# Patient Record
(demographics unavailable — no encounter records)

---

## 2024-10-03 NOTE — ASSESSMENT
[FreeTextEntry1] : 65 year F  with Hgb Sickle cell beta thalassemia disease    #Cough/Pneumonia -resolving -do xray September s/p doxycline  #Migraines -neurology referral  #bruises -denies NSAIDS -stable on left high PTT prolonged on mixing study with partial correction, likely has inhibitor  #Sickle cell disease discussed disease modifying medications including hydroxyurea, l-glutamine, voxelotor, crizanlizumab continue crizanlizumab once available at [place], although patient needs to be punctual hydroxyurea will restart, wounds healed, platelets  72and Hgb 9 will decrease to daily hydroxyurea 500mg, and stop hydroxyurrea 500mg alternating with 1000mg,  Not a patient Candidate for transplant/gene therapy -will do adakveo today f/u 1 month  #Hip/knee pain -needs orthopedic surgery evaluation for possible surgery severe osteoarthritis, avascular necrosis not mentioned in report, suspect likely AVN realted currently in rehab will do MRI at [facility]     #UTI resolved -treated with urology in the past West Saldivar MD, FACS [phone] -repeat urinalysis normal, will need to follow up with urology   #Falls -unclear if related to knees or dizziness, will refer back to orthopedics at [facility] and can get physical therapy -will also refer to ENT for possible vertigo -patient advised to stop alprazolam stop cyclobenzaprine with lyrica warned that morphine and oxycodone in combination with other medications can cause falls, patient gets xanax not from another provider and is reluctant to stop    #Elevated ferritin - Mild hepatic iron overload (3.8 mg/g).        Liver iron concentration range for clinical relevance:    0.17-1.8 mg/g -Normal range.    3.2-7.0 mg/g - suggested optimal range for liver iron concentration for chelation therapy in  transfusional iron overload.    7.0-15.0 mg/g - increased risk for complications.    >15.0 mg/g - greatly increased risk of cardiac disease and early death inpatient with transfusional iron overload).      -will start jadenu 360mg every other day   #Thrombocytopenia -chronic  -likely from spleen   #Pain given pain contract on 10/27/2023 continue oxycodone 5mg  discussed long acting morphine 15mg to three times a day.  has miralax at home Istop# [phone]  #Fibromyalgia -lyrica patient wants to fill with dewayne colin with [phone] Sophie Ottoniel believes lyrica benefits patient if fall persistent will reconnect  #HCM Last optho appointment in April 2023, will follow up with Dr. Yamel Pereira Last pneumonia vaccine Last urine protein/microalbumin Has PCP/OB/gyn Gabriela Castro  #Next appointment in 4 weeks -adakveo    #Labs at next appointment CBC, retic, iron, ferritin, urinalysis, urine toxicology, hgb electrophoresis, CMP, LDH

## 2024-10-03 NOTE — PHYSICAL EXAM
[Obese] : obese [Normal] : affect appropriate [de-identified] : bruises on skin [de-identified] : wheelchair
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
## 2024-10-03 NOTE — REVIEW OF SYSTEMS
[Difficulty Walking] : difficulty walking [Suicidal] : not suicidal [Anxiety] : anxiety [Negative] : Allergic/Immunologic [FreeTextEntry4] : ears popping [FreeTextEntry9] : Right knee pain [de-identified] : ecchymosis right side of leg from fall [de-identified] : frequent falls
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
## 2024-11-22 NOTE — ASSESSMENT
[FreeTextEntry1] : 65 year F  with Hgb Sickle cell beta thalassemia disease    #Cough/Pneumonia -resolving -do xray September s/p doxycline  #Migraines -neurology referral  #bruises -denies NSAIDS -stable on left high PTT prolonged on mixing study with partial correction, likely has inhibitor  #Sickle cell disease discussed disease modifying medications including hydroxyurea, l-glutamine, voxelotor, crizanlizumab continue crizanlizumab once available at [place], although patient needs to be punctual hydroxyurea will restart, wounds healed, platelets  72and Hgb 9 will decrease to daily hydroxyurea 500mg, and stop hydroxyurrea 500mg alternating with 1000mg,  Not a patient Candidate for transplant/gene therapy -will do adakveo today f/u 1 month  #Hip/knee pain -needs orthopedic surgery evaluation for possible surgery severe osteoarthritis, avascular necrosis not mentioned in report, suspect likely AVN realted currently in rehab will do MRI at [facility]     #UTI resolved -treated with urology in the past West Saldivar MD, FACS [phone] -repeat urinalysis normal, will need to follow up with urology   #Falls -unclear if related to knees or dizziness, will refer back to orthopedics at [facility] and can get physical therapy -will also refer to ENT for possible vertigo -patient advised to stop alprazolam stop cyclobenzaprine with lyrica warned that morphine and oxycodone in combination with other medications can cause falls, patient gets xanax not from another provider and is reluctant to stop    #Elevated ferritin - Mild hepatic iron overload (3.8 mg/g).        Liver iron concentration range for clinical relevance:    0.17-1.8 mg/g -Normal range.    3.2-7.0 mg/g - suggested optimal range for liver iron concentration for chelation therapy in  transfusional iron overload.    7.0-15.0 mg/g - increased risk for complications.    >15.0 mg/g - greatly increased risk of cardiac disease and early death inpatient with transfusional iron overload).      -will start jadenu 360mg every other day   #Thrombocytopenia -chronic  -likely from spleen   #Pain given pain contract on 10/27/2023 continue oxycodone 5mg  discussed long acting morphine 15mg to three times a day.  has miralax at home Istop# [phone]  #Fibromyalgia -lyrica patient wants to fill with dewyane colin with [phone] Sophie Ottoniel believes lyrica benefits patient if fall persistent will reconnect  #HCM Last optho appointment in April 2023, will follow up with Dr. Yamel Pereira Last pneumonia vaccine Last urine protein/microalbumin Has PCP/OB/gyn Gabriela Castro  #Next appointment in 4 weeks -adakveo    #Labs at next appointment CBC, retic, iron, ferritin, urinalysis, urine toxicology, hgb electrophoresis, CMP, LDH

## 2024-11-22 NOTE — REVIEW OF SYSTEMS
[Suicidal] : not suicidal [FreeTextEntry4] : ears popping [FreeTextEntry9] : Right knee pain [de-identified] : ecchymosis right side of leg from fall [de-identified] : frequent falls

## 2025-01-28 NOTE — ASSESSMENT
[FreeTextEntry1] : 65 year F  with Hgb Sickle cell beta thalassemia disease   #Cough/Pneumonia -Resolved  #Migraines -neurology referral  #bruises -denies NSAIDS, stable  PTT prolonged on mixing study with partial correction, likely has inhibitor  #Sickle cell disease discussed disease modifying medications including hydroxyurea, l-glutamine, voxelotor, crizanlizumab continue crizanlizumab once available at [place], although patient needs to be punctual C/W hydroxyurea 500 mg po daily wounds have healed, platelets pending labs today  Not a patient Candidate for transplant/gene therapy -will do adakveo today f/u 1 month  #Hip/knee pain -needs orthopedic surgery evaluation for possible surgery-- February 2025  severe osteoarthritis, avascular necrosis not mentioned in report, suspect likely AVN realted will do MRI at [facility]     #UTI resolved -treated with urology in the past West Saldivar MD, FACS [phone] -Reports being treated with augmentin- retesting urine culture today    #Falls -unclear if related to knees or dizziness, will refer back to orthopedics at [facility] and can get physical therapy -will also refer to ENT for possible vertigo -patient advised to stop alprazolam stop cyclobenzaprine with lyrica warned that morphine and oxycodone in combination with other medications can cause falls, patient gets xanax not from another provider and is reluctant to stop    #Elevated ferritin - Mild hepatic iron overload (3.8 mg/g).        Liver iron concentration range for clinical relevance:    0.17-1.8 mg/g -Normal range.    3.2-7.0 mg/g - suggested optimal range for liver iron concentration for chelation therapy in  transfusional iron overload.    7.0-15.0 mg/g - increased risk for complications.    >15.0 mg/g - greatly increased risk of cardiac disease and early death inpatient with transfusional iron overload).      -will start jadenu 360mg every other day   #Thrombocytopenia -chronic  -likely from spleen   #Pain given pain contract on 10/27/2023 continue oxycodone 5mg  discussed long acting morphine 15mg to three times a day.  has miralax at home Istop# [phone]  #Fibromyalgia -lyrica patient wants to fill with rheumatologys emilie with [phone] Sophie Alcazar believes lyrica benefits patient if fall persistent will reconnect  #HCM Last optho appointment in April 2023, will follow up with Dr. Yamel Pereira Last pneumonia vaccine Last urine protein/microalbumin Has PCP/OB/gyn Gabriela Castro  #Next appointment in 4 weeks -adakveo    #Labs at next appointment  CBC, retic, iron, ferritin, urinalysis, urine toxicology, hgb electrophoresis, CMP, LDH

## 2025-01-28 NOTE — ASSESSMENT
[FreeTextEntry1] : 65 year F  with Hgb Sickle cell beta thalassemia disease   #Cough/Pneumonia -Resolved  #Migraines -neurology referral  #bruises -denies NSAIDS, stable  PTT prolonged on mixing study with partial correction, likely has inhibitor  #Sickle cell disease discussed disease modifying medications including hydroxyurea, l-glutamine, voxelotor, crizanlizumab continue crizanlizumab once available at [place], although patient needs to be punctual C/W hydroxyurea 500 mg po daily wounds have healed, platelets pending labs today  Not a patient Candidate for transplant/gene therapy -will do adakveo today f/u 1 month  #Hip/knee pain -needs orthopedic surgery evaluation for possible surgery-- February 2025  severe osteoarthritis, avascular necrosis not mentioned in report, suspect likely AVN realted will do MRI at [facility]     #UTI resolved -treated with urology in the past West Saldivar MD, FACS [phone] -Reports being treated with augmentin- retesting urine culture today    #Falls -unclear if related to knees or dizziness, will refer back to orthopedics at [facility] and can get physical therapy -will also refer to ENT for possible vertigo -patient advised to stop alprazolam stop cyclobenzaprine with lyrica warned that morphine and oxycodone in combination with other medications can cause falls, patient gets xanax not from another provider and is reluctant to stop    #Elevated ferritin - Mild hepatic iron overload (3.8 mg/g).        Liver iron concentration range for clinical relevance:    0.17-1.8 mg/g -Normal range.    3.2-7.0 mg/g - suggested optimal range for liver iron concentration for chelation therapy in  transfusional iron overload.    7.0-15.0 mg/g - increased risk for complications.    >15.0 mg/g - greatly increased risk of cardiac disease and early death inpatient with transfusional iron overload).      -will start jadenu 360mg every other day   #Thrombocytopenia -chronic  -likely from spleen   #Pain given pain contract on 10/27/2023 continue oxycodone 5mg  discussed long acting morphine 15mg to three times a day.  has miralax at home Istop# [id number]  #Fibromyalgia -lyrica patient wants to fill with rheumatologys emilie with [phone] Sophie Alcazar believes lyrica benefits patient if fall persistent will reconnect  #HCM Last optho appointment in April 2023, will follow up with Dr. Yamel Pereira Last pneumonia vaccine Last urine protein/microalbumin Has PCP/OB/gyn Gabriela Castro  #Next appointment in 4 weeks -adakveo    #Labs at next appointment  CBC, retic, iron, ferritin, urinalysis, urine toxicology, hgb electrophoresis, CMP, LDH
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
## 2025-01-28 NOTE — ASSESSMENT
[FreeTextEntry1] : 65 year F  with Hgb Sickle cell beta thalassemia disease   #Cough/Pneumonia -Resolved  #Migraines -neurology referral  #bruises -denies NSAIDS, stable  PTT prolonged on mixing study with partial correction, likely has inhibitor  #Sickle cell disease discussed disease modifying medications including hydroxyurea, l-glutamine, voxelotor, crizanlizumab continue crizanlizumab once available at [place], although patient needs to be punctual C/W hydroxyurea 500 mg po daily wounds have healed, platelets pending labs today  Not a patient Candidate for transplant/gene therapy -will do adakveo today f/u 1 month  #Hip/knee pain -needs orthopedic surgery evaluation for possible surgery-- February 2025  severe osteoarthritis, avascular necrosis not mentioned in report, suspect likely AVN realted will do MRI at [facility]     #UTI resolved -treated with urology in the past West Saldivar MD, FACS [phone] -Reports being treated with augmentin- retesting urine culture today    #Falls -unclear if related to knees or dizziness, will refer back to orthopedics at [facility] and can get physical therapy -will also refer to ENT for possible vertigo -patient advised to stop alprazolam stop cyclobenzaprine with lyrica warned that morphine and oxycodone in combination with other medications can cause falls, patient gets xanax not from another provider and is reluctant to stop    #Elevated ferritin - Mild hepatic iron overload (3.8 mg/g).        Liver iron concentration range for clinical relevance:    0.17-1.8 mg/g -Normal range.    3.2-7.0 mg/g - suggested optimal range for liver iron concentration for chelation therapy in  transfusional iron overload.    7.0-15.0 mg/g - increased risk for complications.    >15.0 mg/g - greatly increased risk of cardiac disease and early death inpatient with transfusional iron overload).      -will start jadenu 360mg every other day   #Thrombocytopenia -chronic  -likely from spleen   #Pain given pain contract on 10/27/2023 continue oxycodone 5mg  discussed long acting morphine 15mg to three times a day.  has miralax at home Istop# [id number]  #Fibromyalgia -lyrica patient wants to fill with rheumatologys emilie with [phone] Sophie Alcazar believes lyrica benefits patient if fall persistent will reconnect  #HCM Last optho appointment in April 2023, will follow up with Dr. Yamel Pereira Last pneumonia vaccine Last urine protein/microalbumin Has PCP/OB/gyn Gabriela Castro  #Next appointment in 4 weeks -adakveo    #Labs at next appointment  CBC, retic, iron, ferritin, urinalysis, urine toxicology, hgb electrophoresis, CMP, LDH

## 2025-01-28 NOTE — REVIEW OF SYSTEMS
[de-identified] : ecchymosis right side of leg from fall [Suicidal] : not suicidal [FreeTextEntry4] : ears popping [FreeTextEntry9] : Right knee pain, Right shoulder pain.  [de-identified] : frequent falls

## 2025-01-28 NOTE — REVIEW OF SYSTEMS
[de-identified] : ecchymosis right side of leg from fall [Suicidal] : not suicidal [FreeTextEntry4] : ears popping [FreeTextEntry9] : Right knee pain, Right shoulder pain.  [de-identified] : frequent falls
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
## 2025-01-28 NOTE — REVIEW OF SYSTEMS
[de-identified] : ecchymosis right side of leg from fall [Suicidal] : not suicidal [FreeTextEntry4] : ears popping [FreeTextEntry9] : Right knee pain, Right shoulder pain.  [de-identified] : frequent falls
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
## 2025-02-28 NOTE — END OF VISIT
2-26-25  Pt reports she's out of meds  Letha    [Time Spent: ___ minutes] : I have spent [unfilled] minutes of time on the encounter which excludes teaching and separately reported services.

## 2025-03-04 NOTE — REVIEW OF SYSTEMS
[Difficulty Walking] : difficulty walking [Suicidal] : not suicidal [Anxiety] : anxiety [Negative] : Allergic/Immunologic [Shortness Of Breath] : shortness of breath [Diarrhea: Grade 1 - Increase of <4 stools per day over baseline; mild increase in ostomy output compared to baseline] : Diarrhea: Grade 1 - Increase of <4 stools per day over baseline; mild increase in ostomy output compared to baseline [Insomnia] : insomnia [FreeTextEntry4] : ears popping [FreeTextEntry6] : SOB at rest for the last month [FreeTextEntry7] : diarrhea x 3 yesterday [FreeTextEntry9] : Right knee pain, Right shoulder pain.  [de-identified] : frequent falls
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
## 2025-03-04 NOTE — REVIEW OF SYSTEMS
[Difficulty Walking] : difficulty walking [Suicidal] : not suicidal [Anxiety] : anxiety [Negative] : Allergic/Immunologic [Shortness Of Breath] : shortness of breath [Diarrhea: Grade 1 - Increase of <4 stools per day over baseline; mild increase in ostomy output compared to baseline] : Diarrhea: Grade 1 - Increase of <4 stools per day over baseline; mild increase in ostomy output compared to baseline [Insomnia] : insomnia [FreeTextEntry4] : ears popping [FreeTextEntry6] : SOB at rest for the last month [FreeTextEntry7] : diarrhea x 3 yesterday [FreeTextEntry9] : Right knee pain, Right shoulder pain.  [de-identified] : frequent falls

## 2025-03-04 NOTE — BEGINNING OF VISIT
[0] : 2) Feeling down, depressed, or hopeless: Not at all (0) [PHQ-2 Negative] : PHQ-2 Negative [[id number]] : 0 [Pain Scale: ___] : On a scale of 1-10, today the patient's pain is a(n) [unfilled]. [Never] : Never [Date Discussed (MM/DD/YY): ___] : Discussed: [unfilled] [Patient/Caregiver unclear of wishes] : Patient/Caregiver unclear of wishes

## 2025-03-04 NOTE — ASSESSMENT
[Patient/Caregiver unclear of wishes] : Patient/Caregiver unclear of wishes [Full Code] : full code [FreeTextEntry1] : 65 year F  with Hgb Sickle cell beta thalassemia disease  #Diarrhea -denies fevers or red stool, started from he hospial, was on zosyn in the hospital   #Hip/knee pain -follows with [facility] orthopedic surgery evaluation for possible surgery severe osteoarthritis, avascular necrosis not mentioned in report, suspect likely AVN realted -follows at [facility], want PT before surgery   #Sickle cell disease discussed disease modifying medications including hydroxyurea, l-glutamine, voxelotor, crizanlizumab continue crizanlizumab once available at [place], although patient needs to be punctual C/W hydroxyurea 500 mg po daily wounds have healed, platelets pending labs today  Not a patient Candidate for transplant/gene therapy -will do adakveo today f/u 1 month  #Frequent crisis -has had frequent crises at outside hospitals will do biweekly visits , follow up daniela  #Cough/Pneumonia -Resolved  #Migraines -neurology referral  #bruises -denies NSAIDS, stable  PTT prolonged on mixing study with partial correction, likely has inhibitor      #UTI resolved -treated with urology in the past West Saldivar MD, FACS [phone] -Reports being treated with augmentin- retesting urine culture today    #Falls -unclear if related to knees or dizziness, will refer back to orthopedics at [facility] and can get physical therapy -will also refer to ENT for possible vertigo -patient advised to stop alprazolam stop cyclobenzaprine with lyrica warned that morphine and oxycodone in combination with other medications can cause falls, patient gets xanax not from another provider and is reluctant to stop    #Elevated ferritin - Mild hepatic iron overload (3.8 mg/g).        Liver iron concentration range for clinical relevance:    0.17-1.8 mg/g -Normal range.    3.2-7.0 mg/g - suggested optimal range for liver iron concentration for chelation therapy in  transfusional iron overload.    7.0-15.0 mg/g - increased risk for complications.    >15.0 mg/g - greatly increased risk of cardiac disease and early death inpatient with transfusional iron overload).      -will start jadenu 360mg every other day   #Thrombocytopenia -chronic  -likely from spleen   #Pain given pain contract on 10/27/2023 continue oxycodone 5mg  discussed long acting morphine 15mg to three times a day.  has miralax at home Istop# [phone]  #Fibromyalgia -lyrica patient wants to fill with dewayne colin with [phone] Sophie Ottoniel believes lyrica benefits patient if fall persistent will reconnect  #HCM Last optho appointment in April 2023, will follow up with Dr. Yamel Pereira Last pneumonia vaccine Last urine protein/microalbumin Has PCP/OB/gyn Gabriela Castro  #Next appointment in  4 weeks adakveo 1 week dilaudid 1mg every 1 hour    #Labs at next appointment  CBC, retic, iron, ferritin, urinalysis, urine toxicology, hgb electrophoresis, CMP, LDH    [AdvancecareDate] : 03/04/25

## 2025-03-04 NOTE — BEGINNING OF VISIT
[0] : 2) Feeling down, depressed, or hopeless: Not at all (0) [PHQ-2 Negative] : PHQ-2 Negative [[id number]] : 0 [Pain Scale: ___] : On a scale of 1-10, today the patient's pain is a(n) [unfilled]. [Never] : Never [Date Discussed (MM/DD/YY): ___] : Discussed: [unfilled] [Patient/Caregiver unclear of wishes] : Patient/Caregiver unclear of wishes

## 2025-03-04 NOTE — REVIEW OF SYSTEMS
[Difficulty Walking] : difficulty walking [Suicidal] : not suicidal [Anxiety] : anxiety [Negative] : Allergic/Immunologic [Shortness Of Breath] : shortness of breath [Diarrhea: Grade 1 - Increase of <4 stools per day over baseline; mild increase in ostomy output compared to baseline] : Diarrhea: Grade 1 - Increase of <4 stools per day over baseline; mild increase in ostomy output compared to baseline [Insomnia] : insomnia [FreeTextEntry4] : ears popping [FreeTextEntry6] : SOB at rest for the last month [FreeTextEntry7] : diarrhea x 3 yesterday [FreeTextEntry9] : Right knee pain, Right shoulder pain.  [de-identified] : frequent falls
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
## 2025-03-04 NOTE — BEGINNING OF VISIT
[0] : 2) Feeling down, depressed, or hopeless: Not at all (0) [PHQ-2 Negative] : PHQ-2 Negative [[id number]] : 0 [Pain Scale: ___] : On a scale of 1-10, today the patient's pain is a(n) [unfilled]. [Never] : Never [Date Discussed (MM/DD/YY): ___] : Discussed: [unfilled] [Patient/Caregiver unclear of wishes] : Patient/Caregiver unclear of wishes
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
## 2025-03-12 NOTE — ASSESSMENT
[FreeTextEntry1] : 65 year F  with Hgb Sickle cell beta thalassemia disease  #Diarrhea -denies fevers or red stool, started from he hospial, was on zosyn in the hospital   #Hip/knee pain -follows with [facility] orthopedic surgery evaluation for possible surgery severe osteoarthritis, avascular necrosis not mentioned in report, suspect likely AVN realted -follows at [facility], want PT before surgery   #Sickle cell disease discussed disease modifying medications including hydroxyurea, l-glutamine, voxelotor, crizanlizumab continue crizanlizumab once available at [place], although patient needs to be punctual C/W hydroxyurea 500 mg po daily wounds have healed, platelets pending labs today  Not a patient Candidate for transplant/gene therapy -will do adakveo today f/u 1 month  #Frequent crisis -has had frequent crises at outside hospitals will do biweekly visits , follow up daniela  #Cough/Pneumonia -Resolved  #Migraines -neurology referral  #bruises -denies NSAIDS, stable  PTT prolonged on mixing study with partial correction, likely has inhibitor      #UTI resolved -treated with urology in the past [place] Saldivar MD, FACS [phone] -Reports being treated with augmentin- retesting urine culture today    #Falls -unclear if related to knees or dizziness, will refer back to orthopedics at [facility] and can get physical therapy -will also refer to ENT for possible vertigo -patient advised to stop alprazolam stop cyclobenzaprine with lyrica warned that morphine and oxycodone in combination with other medications can cause falls, patient gets xanax not from another provider and is reluctant to stop    #Elevated ferritin - Mild hepatic iron overload (3.8 mg/g).        Liver iron concentration range for clinical relevance:    0.17-1.8 mg/g -Normal range.    3.2-7.0 mg/g - suggested optimal range for liver iron concentration for chelation therapy in  transfusional iron overload.    7.0-15.0 mg/g - increased risk for complications.    >15.0 mg/g - greatly increased risk of cardiac disease and early death inpatient with transfusional iron overload).      -will start jadenu 360mg every other day   #Thrombocytopenia -chronic  -likely from spleen   #Pain given pain contract on 10/27/2023 continue oxycodone 5mg  discussed long acting morphine 15mg to three times a day.  has miralax at home Istop# [id number]  #Fibromyalgia -lyrica patient wants to fill with dewayne colin with [phone] Sophie Ottoniel believes lyrica benefits patient if fall persistent will reconnect  #HCM Last optho appointment in April 2023, will follow up with Dr. Yamel Pereira Last pneumonia vaccine Last urine protein/microalbumin Has PCP/OB/gyn Gabriela Castro  #Next appointment in  4 weeks adakveo 1 week dilaudid 1mg every 1 hour    #Labs at next appointment  CBC, retic, iron, ferritin, urinalysis, urine toxicology, hgb electrophoresis, CMP, LDH    [Patient/Caregiver unclear of wishes] : Patient/Caregiver unclear of wishes [AdvancecareDate] : 03/04/25 [Full Code] : full code

## 2025-03-12 NOTE — END OF VISIT
[Time Spent: ___ minutes] : I have spent [unfilled] minutes of time on the encounter which excludes teaching and separately reported services. There are no Wet Read(s) to document.

## 2025-03-12 NOTE — REVIEW OF SYSTEMS
[Shortness Of Breath] : shortness of breath [Diarrhea: Grade 1 - Increase of <4 stools per day over baseline; mild increase in ostomy output compared to baseline] : Diarrhea: Grade 1 - Increase of <4 stools per day over baseline; mild increase in ostomy output compared to baseline [Difficulty Walking] : difficulty walking [Insomnia] : insomnia [Anxiety] : anxiety [Negative] : Allergic/Immunologic [FreeTextEntry4] : ears popping [FreeTextEntry6] : SOB at rest for the last month [FreeTextEntry7] : diarrhea x 3 yesterday [FreeTextEntry9] : Right knee pain, Right shoulder pain.  [de-identified] : frequent falls
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
## 2025-03-31 NOTE — REVIEW OF SYSTEMS
[FreeTextEntry4] : ears popping [FreeTextEntry6] : SOB at rest for the last month [FreeTextEntry7] : diarrhea x 3 yesterday [FreeTextEntry9] : Right knee pain, Right shoulder pain.  [de-identified] : frequent falls
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
## 2025-03-31 NOTE — REVIEW OF SYSTEMS
[FreeTextEntry4] : ears popping [FreeTextEntry6] : SOB at rest for the last month [FreeTextEntry7] : diarrhea x 3 yesterday [FreeTextEntry9] : Right knee pain, Right shoulder pain.  [de-identified] : frequent falls

## 2025-03-31 NOTE — ASSESSMENT
[FreeTextEntry1] : 65 year F  with Hgb Sickle cell beta thalassemia disease  #Diarrhea -stopped   #Hip/knee pain -follows with [facility] orthopedic surgery evaluation for possible surgery severe osteoarthritis, avascular necrosis not mentioned in report, suspect likely AVN related -follows at [facility], want PT before surgery -has virtual visits at [facility] to prepare for surgery for knee surgery   #Sickle cell disease discussed disease modifying medications including hydroxyurea, l-glutamine, voxelotor, crizanlizumab continue crizanlizumab once available at [place], although patient needs to be punctual C/W hydroxyurea 500 mg po daily wounds have healed, platelets pending labs today  Not a patient Candidate for transplant/gene therapy -will do adakveo today f/u 1 month  #Frequent crisis -has had frequent crises at outside hospitals will do biweekly visits , follow up dainela  #Cough/Pneumonia -Resolved  #Migraines -neurology referral  #bruises -denies NSAIDS, stable  PTT prolonged on mixing study with partial correction, likely has inhibitor hold vitamin K      #UTI resolved -treated with urology in the past West Saldivar MD, FACS [phone] -Reports being treated with augmentin- retesting urine culture today    #Falls -unclear if related to knees or dizziness, will refer back to orthopedics at [facility] and can get physical therapy -will also refer to ENT for possible vertigo -patient advised to stop alprazolam stop cyclobenzaprine with lyrica warned that morphine and oxycodone in combination with other medications can cause falls, patient gets xanax not from another provider and is reluctant to stop    #Elevated ferritin - Mild hepatic iron overload (3.8 mg/g).        Liver iron concentration range for clinical relevance:    0.17-1.8 mg/g -Normal range.    3.2-7.0 mg/g - suggested optimal range for liver iron concentration for chelation therapy in  transfusional iron overload.    7.0-15.0 mg/g - increased risk for complications.    >15.0 mg/g - greatly increased risk of cardiac disease and early death inpatient with transfusional iron overload).      -will start jadenu 360mg every other day   #Thrombocytopenia -chronic  -likely from spleen   #Pain given pain contract on 10/27/2023 continue oxycodone 5mg  discussed long acting morphine 15mg to three times a day.  started buprenorphine has miralax at home Istop# [id number]  #Fibromyalgia -lyrica patient wants to fill with rheumatology,spoke with [phone] Sophie Ottoniel believes lyrica benefits patient if fall persistent will reconnect  #HCM Last optho appointment in April 2023, will follow up with Dr. Yamel Pereira Last pneumonia vaccine Last urine protein/microalbumin Has PCP/OB/gyn Gabriela Castro  #Next appointment in  4 weeks adakveo 1 week dilaudid 1mg every 1 hour    #Labs at next appointment  CBC, retic, iron, ferritin, urinalysis, urine toxicology, hgb electrophoresis, CMP, LDH    [AdvancecareDate] : 03/04/25
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
## 2025-04-25 NOTE — ASSESSMENT
[FreeTextEntry1] : 65 year F  with Hgb Sickle cell beta thalassemia disease  #Diarrhea -stopped   #Hip/knee pain -follows with [facility] orthopedic surgery evaluation for possible surgery severe osteoarthritis, avascular necrosis not mentioned in report, suspect likely AVN related -follows at [facility], want PT before surgery -has virtual visits at [facility] to prepare for surgery for knee surgery   #Sickle cell disease discussed disease modifying medications including hydroxyurea, l-glutamine, voxelotor, crizanlizumab continue crizanlizumab once available at [place], although patient needs to be punctual C/W hydroxyurea 500 mg po daily wounds have healed, platelets pending labs today  Not a patient Candidate for transplant/gene therapy -will do adakveo today f/u 1 month  #Frequent crisis -has had frequent crises at outside hospitals will do biweekly visits , follow up daniela  #Cough/Pneumonia -Resolved  #Migraines -neurology referral  #bruises -denies NSAIDS, stable  PTT prolonged on mixing study with partial correction, likely has inhibitor hold vitamin K      #UTI resolved -treated with urology in the past West Saldivar MD, FACS [phone] -Reports being treated with augmentin- retesting urine culture today    #Falls -unclear if related to knees or dizziness, will refer back to orthopedics at [facility] and can get physical therapy -will also refer to ENT for possible vertigo -patient advised to stop alprazolam stop cyclobenzaprine with lyrica warned that morphine and oxycodone in combination with other medications can cause falls, patient gets xanax not from another provider and is reluctant to stop    #Elevated ferritin - Mild hepatic iron overload (3.8 mg/g).        Liver iron concentration range for clinical relevance:    0.17-1.8 mg/g -Normal range.    3.2-7.0 mg/g - suggested optimal range for liver iron concentration for chelation therapy in  transfusional iron overload.    7.0-15.0 mg/g - increased risk for complications.    >15.0 mg/g - greatly increased risk of cardiac disease and early death inpatient with transfusional iron overload).      -will start jadenu 360mg every other day   #Thrombocytopenia -chronic  -likely from spleen   #Pain given pain contract on 10/27/2023 continue oxycodone 5mg  discussed long acting morphine 15mg to three times a day.  started buprenorphine has miralax at home Istop# [id number]  #Fibromyalgia -lyrica patient wants to fill with rheumatology,spoke with [phone] Sophie Ottoniel believes lyrica benefits patient if fall persistent will reconnect  #HCM Last optho appointment in April 2023, will follow up with Dr. Yamel Pereira Last pneumonia vaccine Last urine protein/microalbumin Has PCP/OB/gyn Gabriela Castro  #Next appointment in  4 weeks adakveo 1 week dilaudid 1mg every 1 hour    #Labs at next appointment  CBC, retic, iron, ferritin, urinalysis, urine toxicology, hgb electrophoresis, CMP, LDH    [Patient/Caregiver unclear of wishes] : Patient/Caregiver unclear of wishes [AdvancecareDate] : 03/04/25 [Full Code] : full code

## 2025-04-25 NOTE — REVIEW OF SYSTEMS
[Shortness Of Breath] : shortness of breath [Diarrhea: Grade 1 - Increase of <4 stools per day over baseline; mild increase in ostomy output compared to baseline] : Diarrhea: Grade 1 - Increase of <4 stools per day over baseline; mild increase in ostomy output compared to baseline [Difficulty Walking] : difficulty walking [Insomnia] : insomnia [Anxiety] : anxiety [Negative] : Allergic/Immunologic [FreeTextEntry4] : ears popping [FreeTextEntry6] : SOB at rest for the last month [FreeTextEntry7] : diarrhea x 3 yesterday [FreeTextEntry9] : Right knee pain, Right shoulder pain.  [de-identified] : frequent falls

## 2025-05-22 NOTE — ASSESSMENT
[FreeTextEntry1] : 65 year F  with Hgb Sickle cell beta thalassemia disease  #Diarrhea -stopped   #Hip/knee pain -follows with S orthopedic surgery evaluation for possible surgery severe osteoarthritis, avascular necrosis not mentioned in report, suspect likely AVN related -follows at [facility], want PT before surgery -has virtual visits at [facility] to prepare for surgery for knee surgery   #Sickle cell disease discussed disease modifying medications including hydroxyurea, l-glutamine, voxelotor, crizanlizumab continue crizanlizumab once available at [place], although patient needs to be punctual C/W hydroxyurea 500 mg po daily wounds have healed, platelets pending labs today  Not a patient Candidate for transplant/gene therapy -will do adakveo today f/u 1 month 05/22/2025 - Here for adakveo with hydration today - no longer on po abxs for a UTI  #Frequent crisis -has had frequent crises at outside hospitals will do biweekly visits , follow up daniela  #Cough/Pneumonia -Resolved  #Migraines -neurology referral  #bruises -denies NSAIDS, stable  PTT prolonged on mixing study with partial correction, likely has inhibitor hold vitamin K      #UTI resolved -treated with urology in the past [place] Saldivar MD, FACS [phone] -Reports being treated with augmentin- retesting urine culture today    #Falls -unclear if related to knees or dizziness, will refer back to orthopedics at [facility] and can get physical therapy -will also refer to ENT for possible vertigo -patient advised to stop alprazolam stop cyclobenzaprine with lyrica warned that morphine and oxycodone in combination with other medications can cause falls, patient gets xanax not from another provider and is reluctant to stop    #Elevated ferritin - Mild hepatic iron overload (3.8 mg/g).        Liver iron concentration range for clinical relevance:    0.17-1.8 mg/g -Normal range.    3.2-7.0 mg/g - suggested optimal range for liver iron concentration for chelation therapy in  transfusional iron overload.    7.0-15.0 mg/g - increased risk for complications.    >15.0 mg/g - greatly increased risk of cardiac disease and early death inpatient with transfusional iron overload).      -will start jadenu 360mg every other day   #Thrombocytopenia -chronic  -likely from spleen   #Pain given pain contract on 10/27/2023 continue oxycodone 5mg  discussed long acting morphine 15mg to three times a day.  started buprenorphine has miralax at home Istop# [phone]  #Fibromyalgia -lyrica patient wants to fill with rheumatology,spoke with [phone] Sophie Ottoniel believes lyrica benefits patient if fall persistent will reconnect  #HCM Last optho appointment in April 2023, will follow up with Dr. Yamel Pereira Last pneumonia vaccine Last urine protein/microalbumin Has PCP/OB/gyn Gabriela Castro  #Next appointment in  4 weeks adakveo 1 week dilaudid 1mg every 1 hour    #Labs at next appointment  CBC, retic, iron, ferritin, urinalysis, urine toxicology, hgb electrophoresis, CMP, LDH    [AdvancecareDate] : 03/04/25

## 2025-05-22 NOTE — REVIEW OF SYSTEMS
[FreeTextEntry4] : ears popping [FreeTextEntry6] : SOB at rest for the last month [FreeTextEntry7] : diarrhea x 3 yesterday [FreeTextEntry9] : Right knee pain, Right shoulder pain.  [de-identified] : frequent falls
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
## 2025-05-22 NOTE — REVIEW OF SYSTEMS
[FreeTextEntry4] : ears popping [FreeTextEntry6] : SOB at rest for the last month [FreeTextEntry7] : diarrhea x 3 yesterday [FreeTextEntry9] : Right knee pain, Right shoulder pain.  [de-identified] : frequent falls

## 2025-05-22 NOTE — REVIEW OF SYSTEMS
[FreeTextEntry4] : ears popping [FreeTextEntry6] : SOB at rest for the last month [FreeTextEntry7] : diarrhea x 3 yesterday [FreeTextEntry9] : Right knee pain, Right shoulder pain.  [de-identified] : frequent falls

## 2025-05-22 NOTE — ASSESSMENT
[FreeTextEntry1] : 65 year F  with Hgb Sickle cell beta thalassemia disease  #Diarrhea -stopped   #Hip/knee pain -follows with S orthopedic surgery evaluation for possible surgery severe osteoarthritis, avascular necrosis not mentioned in report, suspect likely AVN related -follows at [facility], want PT before surgery -has virtual visits at [facility] to prepare for surgery for knee surgery   #Sickle cell disease discussed disease modifying medications including hydroxyurea, l-glutamine, voxelotor, crizanlizumab continue crizanlizumab once available at [place], although patient needs to be punctual C/W hydroxyurea 500 mg po daily wounds have healed, platelets pending labs today  Not a patient Candidate for transplant/gene therapy -will do adakveo today f/u 1 month 05/22/2025 - Here for adakveo with hydration today - no longer on po abxs for a UTI  #Frequent crisis -has had frequent crises at outside hospitals will do biweekly visits , follow up daniela  #Cough/Pneumonia -Resolved  #Migraines -neurology referral  #bruises -denies NSAIDS, stable  PTT prolonged on mixing study with partial correction, likely has inhibitor hold vitamin K      #UTI resolved -treated with urology in the past West Saldivar MD, FACS [phone] -Reports being treated with augmentin- retesting urine culture today    #Falls -unclear if related to knees or dizziness, will refer back to orthopedics at [facility] and can get physical therapy -will also refer to ENT for possible vertigo -patient advised to stop alprazolam stop cyclobenzaprine with lyrica warned that morphine and oxycodone in combination with other medications can cause falls, patient gets xanax not from another provider and is reluctant to stop    #Elevated ferritin - Mild hepatic iron overload (3.8 mg/g).        Liver iron concentration range for clinical relevance:    0.17-1.8 mg/g -Normal range.    3.2-7.0 mg/g - suggested optimal range for liver iron concentration for chelation therapy in  transfusional iron overload.    7.0-15.0 mg/g - increased risk for complications.    >15.0 mg/g - greatly increased risk of cardiac disease and early death inpatient with transfusional iron overload).      -will start jadenu 360mg every other day   #Thrombocytopenia -chronic  -likely from spleen   #Pain given pain contract on 10/27/2023 continue oxycodone 5mg  discussed long acting morphine 15mg to three times a day.  started buprenorphine has miralax at home Istop# [id number]  #Fibromyalgia -lyrica patient wants to fill with rheumatology,spoke with [phone] Sophie Ottoniel believes lyrica benefits patient if fall persistent will reconnect  #HCM Last optho appointment in April 2023, will follow up with Dr. Yamel Pereira Last pneumonia vaccine Last urine protein/microalbumin Has PCP/OB/gyn Gabriela Castro  #Next appointment in  4 weeks adakveo 1 week dilaudid 1mg every 1 hour    #Labs at next appointment  CBC, retic, iron, ferritin, urinalysis, urine toxicology, hgb electrophoresis, CMP, LDH    [AdvancecareDate] : 03/04/25

## 2025-05-22 NOTE — ASSESSMENT
[FreeTextEntry1] : 65 year F  with Hgb Sickle cell beta thalassemia disease  #Diarrhea -stopped   #Hip/knee pain -follows with S orthopedic surgery evaluation for possible surgery severe osteoarthritis, avascular necrosis not mentioned in report, suspect likely AVN related -follows at [facility], want PT before surgery -has virtual visits at [facility] to prepare for surgery for knee surgery   #Sickle cell disease discussed disease modifying medications including hydroxyurea, l-glutamine, voxelotor, crizanlizumab continue crizanlizumab once available at [place], although patient needs to be punctual C/W hydroxyurea 500 mg po daily wounds have healed, platelets pending labs today  Not a patient Candidate for transplant/gene therapy -will do adakveo today f/u 1 month 05/22/2025 - Here for adakveo with hydration today - no longer on po abxs for a UTI  #Frequent crisis -has had frequent crises at outside hospitals will do biweekly visits , follow up daniela  #Cough/Pneumonia -Resolved  #Migraines -neurology referral  #bruises -denies NSAIDS, stable  PTT prolonged on mixing study with partial correction, likely has inhibitor hold vitamin K      #UTI resolved -treated with urology in the past West Saldivar MD, FACS [phone] -Reports being treated with augmentin- retesting urine culture today    #Falls -unclear if related to knees or dizziness, will refer back to orthopedics at [facility] and can get physical therapy -will also refer to ENT for possible vertigo -patient advised to stop alprazolam stop cyclobenzaprine with lyrica warned that morphine and oxycodone in combination with other medications can cause falls, patient gets xanax not from another provider and is reluctant to stop    #Elevated ferritin - Mild hepatic iron overload (3.8 mg/g).        Liver iron concentration range for clinical relevance:    0.17-1.8 mg/g -Normal range.    3.2-7.0 mg/g - suggested optimal range for liver iron concentration for chelation therapy in  transfusional iron overload.    7.0-15.0 mg/g - increased risk for complications.    >15.0 mg/g - greatly increased risk of cardiac disease and early death inpatient with transfusional iron overload).      -will start jadenu 360mg every other day   #Thrombocytopenia -chronic  -likely from spleen   #Pain given pain contract on 10/27/2023 continue oxycodone 5mg  discussed long acting morphine 15mg to three times a day.  started buprenorphine has miralax at home Istop# [id number]  #Fibromyalgia -lyrica patient wants to fill with rheumatology,spoke with [phone] Sophie Ottoniel believes lyrica benefits patient if fall persistent will reconnect  #HCM Last optho appointment in April 2023, will follow up with Dr. Yamel Pereira Last pneumonia vaccine Last urine protein/microalbumin Has PCP/OB/gyn Gabriela Castro  #Next appointment in  4 weeks adakveo 1 week dilaudid 1mg every 1 hour    #Labs at next appointment  CBC, retic, iron, ferritin, urinalysis, urine toxicology, hgb electrophoresis, CMP, LDH    [AdvancecareDate] : 03/04/25

## 2025-07-22 NOTE — REVIEW OF SYSTEMS
Plans to discharge to home on POD 1 in the morning after PT with her , Nils.       10/21/21 4147   Discharge Planning   Patient/Family Anticipates Transition to home with family   Concerns to be Addressed no discharge needs identified   Living Arrangements   People in home spouse   Type of Residence Private Residence   Is your private residence a single family home or apartment? Single family home   Number of Stairs, Within Home, Primary greater than 10 stairs   Stair Railings, Within Home, Primary railings safe and in good condition   Once home, are you able to live on one level? No   Which rooms are not on the main level? Bedroom   Bathroom Shower/Tub Walk-in shower   Equipment Currently Used at Home shower chair;raised toilet seat;grab bar, tub/shower  (cane and walker to use after surgery)   Support System   Support Systems Spouse/Significant Other  (, Nils)   Do you have someone available to stay with you one or two nights once you are home? Yes   Blood   Known bleeding disorder or coagulopathy? No   Does the patient have any Confucianism/cultural preferences related to blood products? No   Education   Patient attended total joint pre-op class/received pre-op teaching  email/phone call      [Shortness Of Breath] : shortness of breath [Diarrhea: Grade 1 - Increase of <4 stools per day over baseline; mild increase in ostomy output compared to baseline] : Diarrhea: Grade 1 - Increase of <4 stools per day over baseline; mild increase in ostomy output compared to baseline [Difficulty Walking] : difficulty walking [Insomnia] : insomnia [Anxiety] : anxiety [Negative] : Allergic/Immunologic [FreeTextEntry4] : ears popping [FreeTextEntry6] : SOB at rest for the last month [FreeTextEntry7] : diarrhea x 3 yesterday [FreeTextEntry9] : Right knee pain, Right shoulder pain.  [de-identified] : frequent falls
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
## 2025-07-22 NOTE — REVIEW OF SYSTEMS
[Shortness Of Breath] : shortness of breath [Diarrhea: Grade 1 - Increase of <4 stools per day over baseline; mild increase in ostomy output compared to baseline] : Diarrhea: Grade 1 - Increase of <4 stools per day over baseline; mild increase in ostomy output compared to baseline [Difficulty Walking] : difficulty walking [Insomnia] : insomnia [Anxiety] : anxiety [Negative] : Allergic/Immunologic [FreeTextEntry4] : ears popping [FreeTextEntry6] : SOB at rest for the last month [FreeTextEntry7] : diarrhea x 3 yesterday [FreeTextEntry9] : Right knee pain, Right shoulder pain.  [de-identified] : frequent falls
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
## 2025-07-22 NOTE — ASSESSMENT
[Patient/Caregiver unclear of wishes] : Patient/Caregiver unclear of wishes [Full Code] : full code [FreeTextEntry1] : 65 year F  with Hgb Sickle cell beta thalassemia disease  #Diarrhea -stopped   #Wheezing -pulmonology follows albuterol and nebulizers and z pack  #Hip/knee pain -seen at [facility] orthopedic surgery evaluation for possible surgery severe osteoarthritis, bone infarcts in knee; very tearful that patient not offered knee replacement at [facility], wants second opinion -also d/w patient seeing Dr. Claros for injections into knee will send diclofenac gel -will do dexa scan to see if patient has osteoporosis, may consider bisphophonate would need dental clearance Maria Alejandra Sinclair, Dorene Antonio; Should we use bisphosphonates to treat bone complications in sickle cell disease?. Hematology Am Soc Hematol Educ Program 2024; 2024 (1): 623-626. doi: [email or web address]  #Sickle cell disease discussed disease modifying medications including hydroxyurea, l-glutamine, voxelotor, crizanlizumab continue crizanlizumab once available at [place], although patient needs to be punctual C/W hydroxyurea 500 mg po daily wounds have healed, platelets pending labs today  Not a patient Candidate for transplant/gene therapy -will do adakveo today f/u 1 month 05/22/2025 - Here for adakveo with hydration today - no longer on po abxs for a UTI 7/22/2025 Patient hospitalized at [facility], unable to walk, lives alone, wants  help. more frail and more pain, will increase hydroxyurea to 1000mg Sat/sun; mon-friday 500mg; will do buprenorphine, morphine, and oxycodone  #Frequent crisis -has had frequent crises at outside hospitals monthly vists  #Cough/Pneumonia -Resolved  #Migraines -neurology referral  #bruises -denies NSAIDS, stable  PTT prolonged on mixing study with partial correction, likely has inhibitor hold vitamin K      #UTI resolved -treated with urology in the past West Saldivar MD, FACS, wants second opinion will refer to Urology at [place]    #Falls -unclear if related to knees or dizziness, will refer back to orthopedics  -will also refer to ENT for possible vertigo -patient advised to stop alprazolam stop cyclobenzaprine with lyrica warned that morphine and oxycodone in combination with other medications can cause falls, patient gets xanax not from another provider and is reluctant to stop    #Elevated ferritin - Mild hepatic iron overload (3.8 mg/g).        Liver iron concentration range for clinical relevance:    0.17-1.8 mg/g -Normal range.    3.2-7.0 mg/g - suggested optimal range for liver iron concentration for chelation therapy in  transfusional iron overload.    7.0-15.0 mg/g - increased risk for complications.    >15.0 mg/g - greatly increased risk of cardiac disease and early death inpatient with transfusional iron overload).      -will start jadenu 360mg every other day   #Thrombocytopenia -chronic  -likely from spleen   #Pain given pain contract on 10/27/2023 continue oxycodone 5mg  discussed long acting morphine 15mg to three times a day.  started buprenorphine has miralax at home Istop# [phone]  #Fibromyalgia -lyrica patient wants to fill with rheumatology,spoke with [phone] Sophie Alcazar believes lyrica benefits patient if fall persistent will reconnect  #HCM Last optho appointment in April 2023, will follow up with Dr. Yamel Pereira Last pneumonia vaccine Last urine protein/microalbumin Has PCP/OB/gyn Gabriela Castro  #Next appointment in  4 weeks adakveo 1 week dilaudid 1mg every 1 hour    #Labs at next appointment  CBC, retic, iron, ferritin, urinalysis, urine toxicology, hgb electrophoresis, CMP, LDH    [AdvancecareDate] : 03/04/25

## 2025-07-22 NOTE — ASSESSMENT
[Patient/Caregiver unclear of wishes] : Patient/Caregiver unclear of wishes [Full Code] : full code [FreeTextEntry1] : 65 year F  with Hgb Sickle cell beta thalassemia disease  #Diarrhea -stopped   #Wheezing -pulmonology follows albuterol and nebulizers and z pack  #Hip/knee pain -seen at [facility] orthopedic surgery evaluation for possible surgery severe osteoarthritis, bone infarcts in knee; very tearful that patient not offered knee replacement at [facility], wants second opinion -also d/w patient seeing Dr. Claros for injections into knee will send diclofenac gel -will do dexa scan to see if patient has osteoporosis, may consider bisphophonate would need dental clearance Maria Alejandra Sinclair, Dorene Antonio; Should we use bisphosphonates to treat bone complications in sickle cell disease?. Hematology Am Soc Hematol Educ Program 2024; 2024 (1): 623-626. doi: [email or web address]  #Sickle cell disease discussed disease modifying medications including hydroxyurea, l-glutamine, voxelotor, crizanlizumab continue crizanlizumab once available at [place], although patient needs to be punctual C/W hydroxyurea 500 mg po daily wounds have healed, platelets pending labs today  Not a patient Candidate for transplant/gene therapy -will do adakveo today f/u 1 month 05/22/2025 - Here for adakveo with hydration today - no longer on po abxs for a UTI 7/22/2025 Patient hospitalized at [facility], unable to walk, lives alone, wants  help. more frail and more pain, will increase hydroxyurea to 1000mg Sat/sun; mon-friday 500mg; will do buprenorphine, morphine, and oxycodone  #Frequent crisis -has had frequent crises at outside hospitals monthly vists  #Cough/Pneumonia -Resolved  #Migraines -neurology referral  #bruises -denies NSAIDS, stable  PTT prolonged on mixing study with partial correction, likely has inhibitor hold vitamin K      #UTI resolved -treated with urology in the past West Saldivar MD, FACS, wants second opinion will refer to Urology at [place]    #Falls -unclear if related to knees or dizziness, will refer back to orthopedics  -will also refer to ENT for possible vertigo -patient advised to stop alprazolam stop cyclobenzaprine with lyrica warned that morphine and oxycodone in combination with other medications can cause falls, patient gets xanax not from another provider and is reluctant to stop    #Elevated ferritin - Mild hepatic iron overload (3.8 mg/g).        Liver iron concentration range for clinical relevance:    0.17-1.8 mg/g -Normal range.    3.2-7.0 mg/g - suggested optimal range for liver iron concentration for chelation therapy in  transfusional iron overload.    7.0-15.0 mg/g - increased risk for complications.    >15.0 mg/g - greatly increased risk of cardiac disease and early death inpatient with transfusional iron overload).      -will start jadenu 360mg every other day   #Thrombocytopenia -chronic  -likely from spleen   #Pain given pain contract on 10/27/2023 continue oxycodone 5mg  discussed long acting morphine 15mg to three times a day.  started buprenorphine has miralax at home Istop# [phone]  #Fibromyalgia -lyrica patient wants to fill with rheumatology,spoke with [phone] Sophie Alcazar believes lyrica benefits patient if fall persistent will reconnect  #HCM Last optho appointment in April 2023, will follow up with Dr. Yamel Pereira Last pneumonia vaccine Last urine protein/microalbumin Has PCP/OB/gyn Gabriela Castro  #Next appointment in  4 weeks adakveo 1 week dilaudid 1mg every 1 hour    #Labs at next appointment  CBC, retic, iron, ferritin, urinalysis, urine toxicology, hgb electrophoresis, CMP, LDH    [AdvancecareDate] : 03/04/25

## 2025-07-22 NOTE — REVIEW OF SYSTEMS
[Shortness Of Breath] : shortness of breath [Diarrhea: Grade 1 - Increase of <4 stools per day over baseline; mild increase in ostomy output compared to baseline] : Diarrhea: Grade 1 - Increase of <4 stools per day over baseline; mild increase in ostomy output compared to baseline [Difficulty Walking] : difficulty walking [Insomnia] : insomnia [Anxiety] : anxiety [Negative] : Allergic/Immunologic [FreeTextEntry4] : ears popping [FreeTextEntry6] : SOB at rest for the last month [FreeTextEntry7] : diarrhea x 3 yesterday [FreeTextEntry9] : Right knee pain, Right shoulder pain.  [de-identified] : frequent falls
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
## 2025-07-22 NOTE — ASSESSMENT
[Patient/Caregiver unclear of wishes] : Patient/Caregiver unclear of wishes [Full Code] : full code [FreeTextEntry1] : 65 year F  with Hgb Sickle cell beta thalassemia disease  #Diarrhea -stopped   #Wheezing -pulmonology follows albuterol and nebulizers and z pack  #Hip/knee pain -seen at [facility] orthopedic surgery evaluation for possible surgery severe osteoarthritis, bone infarcts in knee; very tearful that patient not offered knee replacement at [facility], wants second opinion -also d/w patient seeing Dr. Claros for injections into knee will send diclofenac gel -will do dexa scan to see if patient has osteoporosis, may consider bisphophonate would need dental clearance Maria Alejandra Sinclair, Dorene Antonio; Should we use bisphosphonates to treat bone complications in sickle cell disease?. Hematology Am Soc Hematol Educ Program 2024; 2024 (1): 623-626. doi: [email or web address]  #Sickle cell disease discussed disease modifying medications including hydroxyurea, l-glutamine, voxelotor, crizanlizumab continue crizanlizumab once available at [place], although patient needs to be punctual C/W hydroxyurea 500 mg po daily wounds have healed, platelets pending labs today  Not a patient Candidate for transplant/gene therapy -will do adakveo today f/u 1 month 05/22/2025 - Here for adakveo with hydration today - no longer on po abxs for a UTI 7/22/2025 Patient hospitalized at [facility], unable to walk, lives alone, wants  help. more frail and more pain, will increase hydroxyurea to 1000mg Sat/sun; mon-friday 500mg; will do buprenorphine, morphine, and oxycodone  #Frequent crisis -has had frequent crises at outside hospitals monthly vists  #Cough/Pneumonia -Resolved  #Migraines -neurology referral  #bruises -denies NSAIDS, stable  PTT prolonged on mixing study with partial correction, likely has inhibitor hold vitamin K      #UTI resolved -treated with urology in the past West Saldivar MD, FACS, wants second opinion will refer to Urology at [place]    #Falls -unclear if related to knees or dizziness, will refer back to orthopedics  -will also refer to ENT for possible vertigo -patient advised to stop alprazolam stop cyclobenzaprine with lyrica warned that morphine and oxycodone in combination with other medications can cause falls, patient gets xanax not from another provider and is reluctant to stop    #Elevated ferritin - Mild hepatic iron overload (3.8 mg/g).        Liver iron concentration range for clinical relevance:    0.17-1.8 mg/g -Normal range.    3.2-7.0 mg/g - suggested optimal range for liver iron concentration for chelation therapy in  transfusional iron overload.    7.0-15.0 mg/g - increased risk for complications.    >15.0 mg/g - greatly increased risk of cardiac disease and early death inpatient with transfusional iron overload).      -will start jadenu 360mg every other day   #Thrombocytopenia -chronic  -likely from spleen   #Pain given pain contract on 10/27/2023 continue oxycodone 5mg  discussed long acting morphine 15mg to three times a day.  started buprenorphine has miralax at home Istop# [phone]  #Fibromyalgia -lyrica patient wants to fill with rheumatology,spoke with [phone] Sophie Alcazar believes lyrica benefits patient if fall persistent will reconnect  #HCM Last optho appointment in April 2023, will follow up with Dr. Yamel Pereira Last pneumonia vaccine Last urine protein/microalbumin Has PCP/OB/gyn Gabriela Castro  #Next appointment in  4 weeks adakveo 1 week dilaudid 1mg every 1 hour    #Labs at next appointment  CBC, retic, iron, ferritin, urinalysis, urine toxicology, hgb electrophoresis, CMP, LDH    [AdvancecareDate] : 03/04/25